# Patient Record
Sex: FEMALE | URBAN - METROPOLITAN AREA
[De-identification: names, ages, dates, MRNs, and addresses within clinical notes are randomized per-mention and may not be internally consistent; named-entity substitution may affect disease eponyms.]

---

## 2020-02-12 ENCOUNTER — APPOINTMENT (OUTPATIENT)
Age: 85
Setting detail: DERMATOLOGY
End: 2020-02-20

## 2020-02-12 VITALS
HEIGHT: 62 IN | DIASTOLIC BLOOD PRESSURE: 100 MMHG | HEART RATE: 161 BPM | OXYGEN SATURATION: 93 % | SYSTOLIC BLOOD PRESSURE: 148 MMHG | TEMPERATURE: 86.3 F | WEIGHT: 86 LBS

## 2020-02-12 DIAGNOSIS — D22 MELANOCYTIC NEVI: ICD-10-CM

## 2020-02-12 DIAGNOSIS — L81.4 OTHER MELANIN HYPERPIGMENTATION: ICD-10-CM

## 2020-02-12 DIAGNOSIS — D485 NEOPLASM OF UNCERTAIN BEHAVIOR OF SKIN: ICD-10-CM

## 2020-02-12 DIAGNOSIS — L82.1 OTHER SEBORRHEIC KERATOSIS: ICD-10-CM

## 2020-02-12 DIAGNOSIS — D18.0 HEMANGIOMA: ICD-10-CM

## 2020-02-12 PROBLEM — D22.5 MELANOCYTIC NEVI OF TRUNK: Status: ACTIVE | Noted: 2020-02-12

## 2020-02-12 PROBLEM — D48.5 NEOPLASM OF UNCERTAIN BEHAVIOR OF SKIN: Status: ACTIVE | Noted: 2020-02-12

## 2020-02-12 PROBLEM — D18.01 HEMANGIOMA OF SKIN AND SUBCUTANEOUS TISSUE: Status: ACTIVE | Noted: 2020-02-12

## 2020-02-12 PROCEDURE — OTHER MIPS QUALITY: OTHER

## 2020-02-12 PROCEDURE — OTHER PHOTO-DOCUMENTATION: OTHER

## 2020-02-12 PROCEDURE — OTHER ADDITIONAL NOTES: OTHER

## 2020-02-12 PROCEDURE — OTHER TREATMENT REGIMEN: OTHER

## 2020-02-12 PROCEDURE — 99203 OFFICE O/P NEW LOW 30 MIN: CPT

## 2020-02-12 PROCEDURE — OTHER COUNSELING: OTHER

## 2020-02-12 ASSESSMENT — LOCATION DETAILED DESCRIPTION DERM
LOCATION DETAILED: RIGHT DISTAL LATERAL CALF
LOCATION DETAILED: LEFT LATERAL EYEBROW
LOCATION DETAILED: EPIGASTRIC SKIN
LOCATION DETAILED: PERIUMBILICAL SKIN
LOCATION DETAILED: POSTERIOR MID-PARIETAL SCALP
LOCATION DETAILED: LEFT SUPERIOR MEDIAL MIDBACK
LOCATION DETAILED: LEFT MEDIAL UPPER BACK

## 2020-02-12 ASSESSMENT — LOCATION SIMPLE DESCRIPTION DERM
LOCATION SIMPLE: LEFT EYEBROW
LOCATION SIMPLE: ABDOMEN
LOCATION SIMPLE: LEFT UPPER BACK
LOCATION SIMPLE: POSTERIOR SCALP
LOCATION SIMPLE: RIGHT CALF
LOCATION SIMPLE: LEFT LOWER BACK

## 2020-02-12 ASSESSMENT — LOCATION ZONE DERM
LOCATION ZONE: FACE
LOCATION ZONE: SCALP
LOCATION ZONE: LEG
LOCATION ZONE: TRUNK

## 2020-02-12 NOTE — HPI: SKIN LESION
Additional History: Lesion was biopsied by Dr. Walker Centeno at [facility], [place], operated by [facility]. Path report showed lesion is positive for malignancy. The block has been sent to [facility] [place] for a 2nd opinion reading. Additional History: Lesion was biopsied by Dr. Walker Centeno at [facility]. Path report showed lesion is positive for malignancy. The block has been sent to [facility] [place] for a 2nd opinion reading.

## 2020-02-12 NOTE — PROCEDURE: TREATMENT REGIMEN
Plan: Mass was biopsied by Dr. Walker Centeno at [facility]. Path report showed positive for malignancy.\\nBlock sent to [facility] for 2nd opinion reading. Plan: Mass was biopsied by Dr. Walker Centeno at [facility]. Path report showed positive for malignancy.\\nBlock sent to [facility] [place] for 2nd opinion reading.

## 2020-02-12 NOTE — PROCEDURE: ADDITIONAL NOTES
Additional Notes: Unable to perform biopsy or obtain photos due to medical emergency at visit. Patient showed signs of syncope symptoms and was laid down immediately. She was unable to form a complete sentence within the first 2 minutes but was attempting to respond to us. Once she became alert, she was shaking and complaining of chest and bilateral hand pain. Our clinical registered nurse took patient's vitals, BP: 148/100, Temperature: 98.3, Pulse: 161 at onset of symptoms, then 10 minutes later 122, and O2: 93. 911 was called and patient was transported to [facility] for further monitoring. Additional Notes: Unable to perform biopsy or obtain photos due to medical emergency at visit. Patient showed signs of syncope symptoms and was laid down immediately. She was unable to form a complete sentence within the first 2 minutes but was attempting to respond to us. Once she became alert, she was shaking and complaining of chest and bilateral hand pain. Our clinical registered nurse took patient's vitals, BP: 148/100, Temperature: 98.3, Pulse: 161 at onset of symptoms, then 10 minutes later 122, and O2: 93. 911 was called and patient was transported to [facility] for further monitoring.

## 2025-07-14 NOTE — PROCEDURE: MIPS QUALITY
Additional Notes: The patient will be referred back to the primary care physician for BMI management.
No
Detail Level: Detailed
Quality 128: Preventive Care And Screening: Body Mass Index (Bmi) Screening And Follow-Up Plan: BMI is documented below normal parameters and a follow-up plan is documented